# Patient Record
Sex: FEMALE | HISPANIC OR LATINO | Employment: FULL TIME | URBAN - METROPOLITAN AREA
[De-identification: names, ages, dates, MRNs, and addresses within clinical notes are randomized per-mention and may not be internally consistent; named-entity substitution may affect disease eponyms.]

---

## 2017-01-12 ENCOUNTER — TELEPHONE (OUTPATIENT)
Dept: CARDIOLOGY | Facility: MEDICAL CENTER | Age: 47
End: 2017-01-12

## 2017-01-12 NOTE — TELEPHONE ENCOUNTER
Completed Zio Patch reviewed by Dr. Lopez.  No significant abnormalities.  To discuss in follow up with Dr. Lopez 1/24/17  Completed report to scan.

## 2017-01-12 NOTE — TELEPHONE ENCOUNTER
Completed Zio Patch from 12/19/16 to 01/01/17 to Dr. Lopez to review.  Pt does have a follow up with Dr. Lopez scheduled for 1/24/17

## 2017-01-24 ENCOUNTER — OFFICE VISIT (OUTPATIENT)
Dept: CARDIOLOGY | Facility: MEDICAL CENTER | Age: 47
End: 2017-01-24
Payer: COMMERCIAL

## 2017-01-24 VITALS
HEIGHT: 59 IN | BODY MASS INDEX: 29.23 KG/M2 | WEIGHT: 145 LBS | HEART RATE: 96 BPM | SYSTOLIC BLOOD PRESSURE: 110 MMHG | OXYGEN SATURATION: 98 % | DIASTOLIC BLOOD PRESSURE: 70 MMHG

## 2017-01-24 DIAGNOSIS — R06.02 SOB (SHORTNESS OF BREATH): ICD-10-CM

## 2017-01-24 DIAGNOSIS — R00.2 PALPITATIONS: ICD-10-CM

## 2017-01-24 PROCEDURE — 99214 OFFICE O/P EST MOD 30 MIN: CPT | Performed by: INTERNAL MEDICINE

## 2017-01-24 ASSESSMENT — ENCOUNTER SYMPTOMS
MUSCULOSKELETAL NEGATIVE: 1
EYES NEGATIVE: 1
RESPIRATORY NEGATIVE: 1
NEUROLOGICAL NEGATIVE: 1
BRUISES/BLEEDS EASILY: 0
ORTHOPNEA: 0
FEVER: 0
DIZZINESS: 0
HEMOPTYSIS: 0
SPUTUM PRODUCTION: 0
CHILLS: 0
COUGH: 0
CONSTITUTIONAL NEGATIVE: 1
WEAKNESS: 0
LOSS OF CONSCIOUSNESS: 0
GASTROINTESTINAL NEGATIVE: 1
STRIDOR: 0
SORE THROAT: 0
SHORTNESS OF BREATH: 0
WHEEZING: 0
CLAUDICATION: 0
PALPITATIONS: 1
PND: 0

## 2017-01-24 NOTE — MR AVS SNAPSHOT
"Dianelys Ayon   2017 7:45 AM   Office Visit   MRN: [id number]    Department:  [facility]   Dept Phone:  [phone]    Description:  Female : 1970   Provider:  Darius Lopez M.D.           Reason for Visit     Follow-Up           Allergies as of 2017     Allergen Noted Reactions    Codeine 10/31/2016   Hives      You were diagnosed with     Palpitations   [785.1.ICD-9-CM]       SOB (shortness of breath)   [428542]         Vital Signs     Blood Pressure Pulse Height Weight Body Mass Index Oxygen Saturation    110/70 mmHg 96 1.499 m (4' 11.02\") 65.772 kg (145 lb) 29.27 kg/m2 98%    Smoking Status                   Never Smoker            Basic Information     Date Of Birth Sex Race Ethnicity Preferred Language    1970 Female  or   Origin (Yi,Armenian,Vincentian,Kosovan, etc) English      Problem List              ICD-10-CM Priority Class Noted - Resolved    Palpitations R00.2   10/31/2016 - Present      Health Maintenance        Date Due Completion Dates    IMM DTaP/Tdap/Td Vaccine (1 - Tdap) 1990 ---    PAP SMEAR 1992 ---    MAMMOGRAM 2011 ---    IMM INFLUENZA (1) 2016 ---            Current Immunizations     No immunizations on file.      Below and/or attached are the medications your provider expects you to take. Review all of your home medications and newly ordered medications with your provider and/or pharmacist. Follow medication instructions as directed by your provider and/or pharmacist. Please keep your medication list with you and share with your provider. Update the information when medications are discontinued, doses are changed, or new medications (including over-the-counter products) are added; and carry medication information at all times in the event of emergency situations     Allergies:  CODEINE - Hives               Medications  Valid as of: 2017 -  8:12 AM    Generic Name Brand Name " Tablet Size Instructions for use    Aspirin (Tablet Delayed Response) ECOTRIN 81 MG Take 81 mg by mouth every day.        Calcium Carbonate Antacid (Chew Tab) TUMS 500 MG Take 500 mg by mouth every day.        Fluticasone Propionate (Suspension) FLONASE 50 MCG/ACT         Multiple Vitamins-Minerals   Take  by mouth.        .                 Medicines prescribed today were sent to:     [id number] - [place], [place] - [address] [facility]    [address] [place] [address]    Phone: [phone] Fax: [phone]    Open 24 Hours?: No      Medication refill instructions:       If your prescription bottle indicates you have medication refills left, it is not necessary to call your provider’s office. Please contact your pharmacy and they will refill your medication.    If your prescription bottle indicates you do not have any refills left, you may request refills at any time through one of the following ways: The online [employer] system (except Urgent Care), by calling your provider’s office, or by asking your pharmacy to contact your provider’s office with a refill request. Medication refills are processed only during regular business hours and may not be available until the next business day. Your provider may request additional information or to have a follow-up visit with you prior to refilling your medication.   *Please Note: Medication refills are assigned a new Rx number when refilled electronically. Your pharmacy may indicate that no refills were authorized even though a new prescription for the same medication is available at the pharmacy. Please request the medicine by name with the pharmacy before contacting your provider for a refill.           [employer] Access Code: Activation code not generated  Current [employer] Status: Active

## 2017-01-24 NOTE — Clinical Note
[facility]   [address]  [place] [address]  Phone: [phone]  Fax: [phone]              Dianelys Ayon  1970    Encounter Date: 1/24/2017    Darius Lopez M.D.          PROGRESS NOTE:  Subjective:   Dianelys Ayon is a 47 y.o. female who presents today as a follow-up for her event recorder. For the 2 weeks that she wore she had no events. The day after she took it off she felt palpitations 2 times which lasted a few minutes and was associated with dizziness. Since then she has not had any recurrence of her symptoms. She does go for a walk every day a couple of miles with no functional limitations.    Past Medical History   Diagnosis Date   • Allergy      History reviewed. No pertinent past surgical history.  History reviewed. No pertinent family history.  History   Smoking status   • Never Smoker    Smokeless tobacco   • Never Used     Allergies   Allergen Reactions   • Codeine Hives     Outpatient Encounter Prescriptions as of 1/24/2017   Medication Sig Dispense Refill   • calcium carbonate (TUMS) 500 MG Chew Tab Take 500 mg by mouth every day.     • Multiple Vitamins-Minerals (CENTRUM SILVER PO) Take  by mouth.     • fluticasone (FLONASE) 50 MCG/ACT nasal spray      • aspirin EC (ECOTRIN) 81 MG Tablet Delayed Response Take 81 mg by mouth every day.       No facility-administered encounter medications on file as of 1/24/2017.     Review of Systems   Constitutional: Negative.  Negative for fever, chills and malaise/fatigue.   HENT: Negative.  Negative for sore throat.    Eyes: Negative.    Respiratory: Negative.  Negative for cough, hemoptysis, sputum production, shortness of breath, wheezing and stridor.    Cardiovascular: Positive for palpitations. Negative for chest pain, orthopnea, claudication, leg swelling and PND.   Gastrointestinal: Negative.    Genitourinary: Negative.    Musculoskeletal: Negative.     "  Skin: Negative.    Neurological: Negative.  Negative for dizziness, loss of consciousness and weakness.   Endo/Heme/Allergies: Negative.  Does not bruise/bleed easily.   All other systems reviewed and are negative.       Objective:   /70 mmHg  Pulse 96  Ht 1.499 m (4' 11.02\")  Wt 65.772 kg (145 lb)  BMI 29.27 kg/m2  SpO2 98%    Physical Exam   Constitutional: She is oriented to person, place, and time. She appears well-developed and well-nourished. No distress.   HENT:   Head: Normocephalic.   Mouth/Throat: Oropharynx is clear and moist.   Eyes: EOM are normal. Pupils are equal, round, and reactive to light. Right eye exhibits no discharge. Left eye exhibits no discharge. No scleral icterus.   Neck: Normal range of motion. Neck supple. No JVD present. No tracheal deviation present.   Cardiovascular: Normal rate, regular rhythm, S1 normal, S2 normal, normal heart sounds, intact distal pulses and normal pulses.  Exam reveals no gallop, no S3, no S4 and no friction rub.    No murmur heard.   No systolic murmur is present    No diastolic murmur is present   Pulses:       Carotid pulses are 2+ on the right side, and 2+ on the left side.       Radial pulses are 2+ on the right side, and 2+ on the left side.        Dorsalis pedis pulses are 2+ on the right side, and 2+ on the left side.        Posterior tibial pulses are 2+ on the right side, and 2+ on the left side.   Pulmonary/Chest: Effort normal and breath sounds normal. No respiratory distress. She has no wheezes. She has no rales.   Abdominal: Soft. Bowel sounds are normal. She exhibits no distension and no mass. There is no tenderness. There is no rebound and no guarding.   Musculoskeletal: She exhibits no edema.   Neurological: She is alert and oriented to person, place, and time. No cranial nerve deficit.   Skin: Skin is warm and dry. She is not diaphoretic. No pallor.   Psychiatric: She has a normal mood and affect. Her behavior is normal. Judgment " and thought content normal.   Nursing note and vitals reviewed.      Assessment:     1. Palpitations  Treadmill Stress   2. SOB (shortness of breath)  Treadmill Stress       Medical Decision Making:  Today's Assessment / Status / Plan:     47-year-old female with palpitations which sound benign in nature. I did offer her a stress test to further reassure her. It does sound to me like she is having symptomatic PVCs or PACs but would be very surprised to find anything further than that. I did also offer a 30 day event recorder but she is declined at this point. She is only looking for reassurance that this is a benign finding. She'll follow up with us as needed.    Thank for you allowing me to take part in your patient's care, please call should you have any questions or would like to discuss this patient.      Melvin Landrum M.D.   [facility] [id number]  [id number]  [place] [id number]  VIA Facsimile: [phone]

## 2017-01-24 NOTE — PROGRESS NOTES
Subjective:   Dianelys Ayon is a 47 y.o. female who presents today as a follow-up for her event recorder. For the 2 weeks that she wore she had no events. The day after she took it off she felt palpitations 2 times which lasted a few minutes and was associated with dizziness. Since then she has not had any recurrence of her symptoms. She does go for a walk every day a couple of miles with no functional limitations.    Past Medical History   Diagnosis Date   • Allergy      History reviewed. No pertinent past surgical history.  History reviewed. No pertinent family history.  History   Smoking status   • Never Smoker    Smokeless tobacco   • Never Used     Allergies   Allergen Reactions   • Codeine Hives     Outpatient Encounter Prescriptions as of 1/24/2017   Medication Sig Dispense Refill   • calcium carbonate (TUMS) 500 MG Chew Tab Take 500 mg by mouth every day.     • Multiple Vitamins-Minerals (CENTRUM SILVER PO) Take  by mouth.     • fluticasone (FLONASE) 50 MCG/ACT nasal spray      • aspirin EC (ECOTRIN) 81 MG Tablet Delayed Response Take 81 mg by mouth every day.       No facility-administered encounter medications on file as of 1/24/2017.     Review of Systems   Constitutional: Negative.  Negative for fever, chills and malaise/fatigue.   HENT: Negative.  Negative for sore throat.    Eyes: Negative.    Respiratory: Negative.  Negative for cough, hemoptysis, sputum production, shortness of breath, wheezing and stridor.    Cardiovascular: Positive for palpitations. Negative for chest pain, orthopnea, claudication, leg swelling and PND.   Gastrointestinal: Negative.    Genitourinary: Negative.    Musculoskeletal: Negative.    Skin: Negative.    Neurological: Negative.  Negative for dizziness, loss of consciousness and weakness.   Endo/Heme/Allergies: Negative.  Does not bruise/bleed easily.   All other systems reviewed and are negative.       Objective:   /70 mmHg  Pulse 96  Ht 1.499 m (4'  "11.02\")  Wt 65.772 kg (145 lb)  BMI 29.27 kg/m2  SpO2 98%    Physical Exam   Constitutional: She is oriented to person, place, and time. She appears well-developed and well-nourished. No distress.   HENT:   Head: Normocephalic.   Mouth/Throat: Oropharynx is clear and moist.   Eyes: EOM are normal. Pupils are equal, round, and reactive to light. Right eye exhibits no discharge. Left eye exhibits no discharge. No scleral icterus.   Neck: Normal range of motion. Neck supple. No JVD present. No tracheal deviation present.   Cardiovascular: Normal rate, regular rhythm, S1 normal, S2 normal, normal heart sounds, intact distal pulses and normal pulses.  Exam reveals no gallop, no S3, no S4 and no friction rub.    No murmur heard.   No systolic murmur is present    No diastolic murmur is present   Pulses:       Carotid pulses are 2+ on the right side, and 2+ on the left side.       Radial pulses are 2+ on the right side, and 2+ on the left side.        Dorsalis pedis pulses are 2+ on the right side, and 2+ on the left side.        Posterior tibial pulses are 2+ on the right side, and 2+ on the left side.   Pulmonary/Chest: Effort normal and breath sounds normal. No respiratory distress. She has no wheezes. She has no rales.   Abdominal: Soft. Bowel sounds are normal. She exhibits no distension and no mass. There is no tenderness. There is no rebound and no guarding.   Musculoskeletal: She exhibits no edema.   Neurological: She is alert and oriented to person, place, and time. No cranial nerve deficit.   Skin: Skin is warm and dry. She is not diaphoretic. No pallor.   Psychiatric: She has a normal mood and affect. Her behavior is normal. Judgment and thought content normal.   Nursing note and vitals reviewed.      Assessment:     1. Palpitations  Treadmill Stress   2. SOB (shortness of breath)  Treadmill Stress       Medical Decision Making:  Today's Assessment / Status / Plan:     47-year-old female with palpitations " which sound benign in nature. I did offer her a stress test to further reassure her. It does sound to me like she is having symptomatic PVCs or PACs but would be very surprised to find anything further than that. I did also offer a 30 day event recorder but she is declined at this point. She is only looking for reassurance that this is a benign finding. She'll follow up with us as needed.    Thank for you allowing me to take part in your patient's care, please call should you have any questions or would like to discuss this patient.

## 2017-02-14 ENCOUNTER — NON-PROVIDER VISIT (OUTPATIENT)
Dept: CARDIOLOGY | Facility: MEDICAL CENTER | Age: 47
End: 2017-02-14
Attending: INTERNAL MEDICINE
Payer: COMMERCIAL

## 2017-02-14 VITALS
HEART RATE: 92 BPM | SYSTOLIC BLOOD PRESSURE: 118 MMHG | OXYGEN SATURATION: 96 % | DIASTOLIC BLOOD PRESSURE: 64 MMHG | BODY MASS INDEX: 29.23 KG/M2 | WEIGHT: 145 LBS | HEIGHT: 59 IN

## 2017-02-14 DIAGNOSIS — R00.2 PALPITATIONS: ICD-10-CM

## 2017-02-14 DIAGNOSIS — R06.02 SOB (SHORTNESS OF BREATH): ICD-10-CM

## 2017-02-14 LAB — TREADMILL STRESS: NORMAL

## 2017-02-14 PROCEDURE — 93015 CV STRESS TEST SUPVJ I&R: CPT | Performed by: INTERNAL MEDICINE

## 2017-03-06 ENCOUNTER — HOSPITAL ENCOUNTER (OUTPATIENT)
Dept: LAB | Facility: MEDICAL CENTER | Age: 47
End: 2017-03-06
Attending: FAMILY MEDICINE
Payer: COMMERCIAL

## 2017-03-06 PROCEDURE — 80061 LIPID PANEL: CPT

## 2017-03-06 PROCEDURE — 83001 ASSAY OF GONADOTROPIN (FSH): CPT

## 2017-03-06 PROCEDURE — 83002 ASSAY OF GONADOTROPIN (LH): CPT

## 2017-03-06 PROCEDURE — 84443 ASSAY THYROID STIM HORMONE: CPT

## 2017-03-06 PROCEDURE — 36415 COLL VENOUS BLD VENIPUNCTURE: CPT

## 2017-03-06 PROCEDURE — 85025 COMPLETE CBC W/AUTO DIFF WBC: CPT

## 2017-03-07 LAB
BASOPHILS # BLD AUTO: 0.03 K/UL (ref 0–0.12)
BASOPHILS NFR BLD AUTO: 0.5 % (ref 0–1.8)
CHOLEST SERPL-MCNC: 197 MG/DL (ref 100–199)
EOSINOPHIL # BLD: 0.07 K/UL (ref 0–0.51)
EOSINOPHIL NFR BLD AUTO: 1.2 % (ref 0–6.9)
ERYTHROCYTE [DISTWIDTH] IN BLOOD BY AUTOMATED COUNT: 45 FL (ref 35.9–50)
FSH SERPL-ACNC: 11.6 MIU/ML
HCT VFR BLD AUTO: 42.7 % (ref 37–47)
HDLC SERPL-MCNC: 41 MG/DL
HGB BLD-MCNC: 13.4 G/DL (ref 12–16)
IMM GRANULOCYTES # BLD AUTO: 0.01 K/UL (ref 0–0.11)
IMM GRANULOCYTES NFR BLD AUTO: 0.2 % (ref 0–0.9)
LDLC SERPL CALC-MCNC: 132 MG/DL
LH SERPL-ACNC: 4 IU/L
LYMPHOCYTES # BLD: 1.88 K/UL (ref 1–4.8)
LYMPHOCYTES NFR BLD AUTO: 31.2 % (ref 22–41)
MCH RBC QN AUTO: 30 PG (ref 27–33)
MCHC RBC AUTO-ENTMCNC: 31.4 G/DL (ref 33.6–35)
MCV RBC AUTO: 95.5 FL (ref 81.4–97.8)
MONOCYTES # BLD: 0.44 K/UL (ref 0–0.85)
MONOCYTES NFR BLD AUTO: 7.3 % (ref 0–13.4)
NEUTROPHILS # BLD: 3.6 K/UL (ref 2–7.15)
NEUTROPHILS NFR BLD AUTO: 59.6 % (ref 44–72)
NRBC # BLD AUTO: 0 K/UL
NRBC BLD-RTO: 0 /100 WBC
PLATELET # BLD AUTO: 289 K/UL (ref 164–446)
PMV BLD AUTO: 11.2 FL (ref 9–12.9)
RBC # BLD AUTO: 4.47 M/UL (ref 4.2–5.4)
TRIGL SERPL-MCNC: 119 MG/DL (ref 0–149)
TSH SERPL DL<=0.005 MIU/L-ACNC: 1.02 UIU/ML (ref 0.3–3.7)
WBC # BLD AUTO: 6 K/UL (ref 4.8–10.8)

## 2017-03-13 ENCOUNTER — HOSPITAL ENCOUNTER (OUTPATIENT)
Dept: RADIOLOGY | Facility: MEDICAL CENTER | Age: 47
End: 2017-03-13
Attending: FAMILY MEDICINE
Payer: COMMERCIAL

## 2017-03-13 DIAGNOSIS — N92.0 MENORRHAGIA WITH REGULAR CYCLE: ICD-10-CM

## 2017-03-13 DIAGNOSIS — N94.6 DYSMENORRHEA: ICD-10-CM

## 2017-03-13 PROCEDURE — 76830 TRANSVAGINAL US NON-OB: CPT

## 2017-05-15 ENCOUNTER — HOSPITAL ENCOUNTER (OUTPATIENT)
Dept: RADIOLOGY | Facility: MEDICAL CENTER | Age: 47
End: 2017-05-15
Attending: FAMILY MEDICINE
Payer: COMMERCIAL

## 2017-05-15 DIAGNOSIS — G44.001 INTRACTABLE CLUSTER HEADACHE: ICD-10-CM

## 2017-05-15 PROCEDURE — A9579 GAD-BASE MR CONTRAST NOS,1ML: HCPCS | Performed by: FAMILY MEDICINE

## 2017-05-15 PROCEDURE — 700117 HCHG RX CONTRAST REV CODE 255: Performed by: FAMILY MEDICINE

## 2017-05-15 PROCEDURE — 70553 MRI BRAIN STEM W/O & W/DYE: CPT

## 2017-05-15 RX ADMIN — GADODIAMIDE 14 ML: 287 INJECTION INTRAVENOUS at 10:28

## 2018-04-25 ENCOUNTER — HOSPITAL ENCOUNTER (OUTPATIENT)
Dept: LAB | Facility: MEDICAL CENTER | Age: 48
End: 2018-04-25
Attending: FAMILY MEDICINE
Payer: COMMERCIAL

## 2018-04-25 LAB
ALBUMIN SERPL BCP-MCNC: 4.2 G/DL (ref 3.2–4.9)
ALBUMIN/GLOB SERPL: 1.1 G/DL
ALP SERPL-CCNC: 73 U/L (ref 30–99)
ALT SERPL-CCNC: 9 U/L (ref 2–50)
ANION GAP SERPL CALC-SCNC: 7 MMOL/L (ref 0–11.9)
AST SERPL-CCNC: 14 U/L (ref 12–45)
BASOPHILS # BLD AUTO: 0.5 % (ref 0–1.8)
BASOPHILS # BLD: 0.03 K/UL (ref 0–0.12)
BILIRUB SERPL-MCNC: 0.4 MG/DL (ref 0.1–1.5)
BUN SERPL-MCNC: 12 MG/DL (ref 8–22)
CALCIUM SERPL-MCNC: 9.5 MG/DL (ref 8.5–10.5)
CHLORIDE SERPL-SCNC: 105 MMOL/L (ref 96–112)
CHOLEST SERPL-MCNC: 166 MG/DL (ref 100–199)
CO2 SERPL-SCNC: 24 MMOL/L (ref 20–33)
CREAT SERPL-MCNC: 0.69 MG/DL (ref 0.5–1.4)
EOSINOPHIL # BLD AUTO: 0.07 K/UL (ref 0–0.51)
EOSINOPHIL NFR BLD: 1.2 % (ref 0–6.9)
ERYTHROCYTE [DISTWIDTH] IN BLOOD BY AUTOMATED COUNT: 46.8 FL (ref 35.9–50)
GLOBULIN SER CALC-MCNC: 3.8 G/DL (ref 1.9–3.5)
GLUCOSE SERPL-MCNC: 97 MG/DL (ref 65–99)
HCT VFR BLD AUTO: 40.2 % (ref 37–47)
HDLC SERPL-MCNC: 36 MG/DL
HGB BLD-MCNC: 13.3 G/DL (ref 12–16)
IMM GRANULOCYTES # BLD AUTO: 0.01 K/UL (ref 0–0.11)
IMM GRANULOCYTES NFR BLD AUTO: 0.2 % (ref 0–0.9)
LDLC SERPL CALC-MCNC: 111 MG/DL
LYMPHOCYTES # BLD AUTO: 1.68 K/UL (ref 1–4.8)
LYMPHOCYTES NFR BLD: 29.1 % (ref 22–41)
MCH RBC QN AUTO: 30.6 PG (ref 27–33)
MCHC RBC AUTO-ENTMCNC: 33.1 G/DL (ref 33.6–35)
MCV RBC AUTO: 92.4 FL (ref 81.4–97.8)
MONOCYTES # BLD AUTO: 0.48 K/UL (ref 0–0.85)
MONOCYTES NFR BLD AUTO: 8.3 % (ref 0–13.4)
NEUTROPHILS # BLD AUTO: 3.51 K/UL (ref 2–7.15)
NEUTROPHILS NFR BLD: 60.7 % (ref 44–72)
NRBC # BLD AUTO: 0 K/UL
NRBC BLD-RTO: 0 /100 WBC
PLATELET # BLD AUTO: 253 K/UL (ref 164–446)
PMV BLD AUTO: 10.7 FL (ref 9–12.9)
POTASSIUM SERPL-SCNC: 3.8 MMOL/L (ref 3.6–5.5)
PROT SERPL-MCNC: 8 G/DL (ref 6–8.2)
RBC # BLD AUTO: 4.35 M/UL (ref 4.2–5.4)
SODIUM SERPL-SCNC: 136 MMOL/L (ref 135–145)
TRIGL SERPL-MCNC: 94 MG/DL (ref 0–149)
WBC # BLD AUTO: 5.8 K/UL (ref 4.8–10.8)

## 2018-04-25 PROCEDURE — 36415 COLL VENOUS BLD VENIPUNCTURE: CPT

## 2018-04-25 PROCEDURE — 85025 COMPLETE CBC W/AUTO DIFF WBC: CPT

## 2018-04-25 PROCEDURE — 80061 LIPID PANEL: CPT

## 2018-04-25 PROCEDURE — 80053 COMPREHEN METABOLIC PANEL: CPT

## 2019-09-12 ENCOUNTER — HOSPITAL ENCOUNTER (OUTPATIENT)
Dept: LAB | Facility: MEDICAL CENTER | Age: 49
End: 2019-09-12
Attending: FAMILY MEDICINE
Payer: COMMERCIAL

## 2019-09-12 LAB
ALBUMIN SERPL BCP-MCNC: 4.9 G/DL (ref 3.2–4.9)
ALBUMIN/GLOB SERPL: 1.4 G/DL
ALP SERPL-CCNC: 77 U/L (ref 30–99)
ALT SERPL-CCNC: 12 U/L (ref 2–50)
ANION GAP SERPL CALC-SCNC: 10 MMOL/L (ref 0–11.9)
AST SERPL-CCNC: 15 U/L (ref 12–45)
BASOPHILS # BLD AUTO: 1 % (ref 0–1.8)
BASOPHILS # BLD: 0.05 K/UL (ref 0–0.12)
BILIRUB SERPL-MCNC: 0.4 MG/DL (ref 0.1–1.5)
BUN SERPL-MCNC: 11 MG/DL (ref 8–22)
CALCIUM SERPL-MCNC: 10.3 MG/DL (ref 8.5–10.5)
CHLORIDE SERPL-SCNC: 104 MMOL/L (ref 96–112)
CHOLEST SERPL-MCNC: 206 MG/DL (ref 100–199)
CO2 SERPL-SCNC: 26 MMOL/L (ref 20–33)
CREAT SERPL-MCNC: 0.78 MG/DL (ref 0.5–1.4)
EOSINOPHIL # BLD AUTO: 0.05 K/UL (ref 0–0.51)
EOSINOPHIL NFR BLD: 1 % (ref 0–6.9)
ERYTHROCYTE [DISTWIDTH] IN BLOOD BY AUTOMATED COUNT: 46.4 FL (ref 35.9–50)
FSH SERPL-ACNC: 87.8 MIU/ML
GLOBULIN SER CALC-MCNC: 3.4 G/DL (ref 1.9–3.5)
GLUCOSE SERPL-MCNC: 94 MG/DL (ref 65–99)
HCT VFR BLD AUTO: 45.1 % (ref 37–47)
HDLC SERPL-MCNC: 44 MG/DL
HGB BLD-MCNC: 14.3 G/DL (ref 12–16)
IMM GRANULOCYTES # BLD AUTO: 0.01 K/UL (ref 0–0.11)
IMM GRANULOCYTES NFR BLD AUTO: 0.2 % (ref 0–0.9)
LDLC SERPL CALC-MCNC: 137 MG/DL
LH SERPL-ACNC: 39 IU/L
LYMPHOCYTES # BLD AUTO: 1.77 K/UL (ref 1–4.8)
LYMPHOCYTES NFR BLD: 35.8 % (ref 22–41)
MCH RBC QN AUTO: 29.3 PG (ref 27–33)
MCHC RBC AUTO-ENTMCNC: 31.7 G/DL (ref 33.6–35)
MCV RBC AUTO: 92.4 FL (ref 81.4–97.8)
MONOCYTES # BLD AUTO: 0.36 K/UL (ref 0–0.85)
MONOCYTES NFR BLD AUTO: 7.3 % (ref 0–13.4)
NEUTROPHILS # BLD AUTO: 2.7 K/UL (ref 2–7.15)
NEUTROPHILS NFR BLD: 54.7 % (ref 44–72)
NRBC # BLD AUTO: 0 K/UL
NRBC BLD-RTO: 0 /100 WBC
PLATELET # BLD AUTO: 255 K/UL (ref 164–446)
PMV BLD AUTO: 11 FL (ref 9–12.9)
POTASSIUM SERPL-SCNC: 4.4 MMOL/L (ref 3.6–5.5)
PROT SERPL-MCNC: 8.3 G/DL (ref 6–8.2)
RBC # BLD AUTO: 4.88 M/UL (ref 4.2–5.4)
SODIUM SERPL-SCNC: 140 MMOL/L (ref 135–145)
TRIGL SERPL-MCNC: 126 MG/DL (ref 0–149)
TSH SERPL DL<=0.005 MIU/L-ACNC: 1.27 UIU/ML (ref 0.38–5.33)
WBC # BLD AUTO: 4.9 K/UL (ref 4.8–10.8)

## 2019-09-12 PROCEDURE — 36415 COLL VENOUS BLD VENIPUNCTURE: CPT

## 2019-09-12 PROCEDURE — 82671 ASSAY OF ESTROGENS: CPT

## 2019-09-12 PROCEDURE — 84443 ASSAY THYROID STIM HORMONE: CPT

## 2019-09-12 PROCEDURE — 80053 COMPREHEN METABOLIC PANEL: CPT

## 2019-09-12 PROCEDURE — 87077 CULTURE AEROBIC IDENTIFY: CPT

## 2019-09-12 PROCEDURE — 83001 ASSAY OF GONADOTROPIN (FSH): CPT

## 2019-09-12 PROCEDURE — 83002 ASSAY OF GONADOTROPIN (LH): CPT

## 2019-09-12 PROCEDURE — 87086 URINE CULTURE/COLONY COUNT: CPT

## 2019-09-12 PROCEDURE — 85025 COMPLETE CBC W/AUTO DIFF WBC: CPT

## 2019-09-12 PROCEDURE — 87186 SC STD MICRODIL/AGAR DIL: CPT

## 2019-09-12 PROCEDURE — 80061 LIPID PANEL: CPT

## 2019-09-14 LAB
BACTERIA UR CULT: ABNORMAL
BACTERIA UR CULT: ABNORMAL
SIGNIFICANT IND 70042: ABNORMAL
SITE SITE: ABNORMAL
SOURCE SOURCE: ABNORMAL

## 2019-09-19 LAB
ESTRADIOL SERPL HS-MCNC: 4.2 PG/ML
ESTROGEN SERPL CALC-MCNC: 16.2 PG/ML
ESTRONE SERPL-MCNC: 12 PG/ML

## 2019-11-18 ENCOUNTER — HOSPITAL ENCOUNTER (OUTPATIENT)
Dept: LAB | Facility: MEDICAL CENTER | Age: 49
End: 2019-11-18
Attending: FAMILY MEDICINE
Payer: COMMERCIAL

## 2019-11-18 LAB
ERYTHROCYTE [DISTWIDTH] IN BLOOD BY AUTOMATED COUNT: 47.7 FL (ref 35.9–50)
HCT VFR BLD AUTO: 39.4 % (ref 37–47)
HGB BLD-MCNC: 13.1 G/DL (ref 12–16)
MCH RBC QN AUTO: 31.2 PG (ref 27–33)
MCHC RBC AUTO-ENTMCNC: 33.2 G/DL (ref 33.6–35)
MCV RBC AUTO: 93.8 FL (ref 81.4–97.8)
PLATELET # BLD AUTO: 243 K/UL (ref 164–446)
PMV BLD AUTO: 10.7 FL (ref 9–12.9)
RBC # BLD AUTO: 4.2 M/UL (ref 4.2–5.4)
WBC # BLD AUTO: 4.8 K/UL (ref 4.8–10.8)

## 2019-11-18 PROCEDURE — 85027 COMPLETE CBC AUTOMATED: CPT

## 2019-11-18 PROCEDURE — 36415 COLL VENOUS BLD VENIPUNCTURE: CPT

## 2020-03-02 ENCOUNTER — NON-PROVIDER VISIT (OUTPATIENT)
Dept: URGENT CARE | Facility: PHYSICIAN GROUP | Age: 50
End: 2020-03-02

## 2020-03-02 DIAGNOSIS — Z11.1 PPD SCREENING TEST: Primary | ICD-10-CM

## 2020-03-02 PROCEDURE — 86580 TB INTRADERMAL TEST: CPT | Performed by: FAMILY MEDICINE

## 2020-03-02 NOTE — PROGRESS NOTES
Dianelys Ayon is a 50 y.o. female here for a non-provider visit for PPD placement -- Step 1 of 1    Reason for PPD:  work requirement    1. TB evaluation questionnaire completed by patient? Yes      -  If any answers marked yes did you contact a provider prior to placing? Not Indicated  2.  Patient notified to return to clinic for reading on: 3/4/2020-3/5/2020  3.  PPD Placement documentation completed on TB evaluation questionnaire? Yes  4.  Location of TB evaluation questionnaire filed: [facility]

## 2020-03-04 ENCOUNTER — NON-PROVIDER VISIT (OUTPATIENT)
Dept: URGENT CARE | Facility: PHYSICIAN GROUP | Age: 50
End: 2020-03-04

## 2020-03-04 LAB — TB WHEAL 3D P 5 TU DIAM: NORMAL MM

## 2020-03-05 NOTE — PROGRESS NOTES
Dianelys Ayon is a 50 y.o. female here for a non-provider visit for PPD reading -- Step 1 of 1.      1.  Resulted in Epic under enter/edit results? Yes   2.  TB evaluation questionnaire scanned into chart and original given to patient?Yes      3. Was induration greater than 0 mm? Yes, 5mm        Routed to PCP? No